# Patient Record
Sex: MALE | Race: OTHER | HISPANIC OR LATINO | URBAN - METROPOLITAN AREA
[De-identification: names, ages, dates, MRNs, and addresses within clinical notes are randomized per-mention and may not be internally consistent; named-entity substitution may affect disease eponyms.]

---

## 2020-06-08 ENCOUNTER — EMERGENCY (EMERGENCY)
Facility: HOSPITAL | Age: 75
LOS: 1 days | Discharge: HOME | End: 2020-06-08
Attending: PERSONAL EMERGENCY RESPONSE ATTENDANT
Payer: SELF-PAY

## 2020-06-08 VITALS
SYSTOLIC BLOOD PRESSURE: 137 MMHG | HEIGHT: 66 IN | OXYGEN SATURATION: 98 % | DIASTOLIC BLOOD PRESSURE: 84 MMHG | HEART RATE: 78 BPM | RESPIRATION RATE: 18 BRPM | TEMPERATURE: 99 F | WEIGHT: 190.04 LBS

## 2020-06-08 VITALS
OXYGEN SATURATION: 99 % | TEMPERATURE: 99 F | HEART RATE: 72 BPM | RESPIRATION RATE: 18 BRPM | DIASTOLIC BLOOD PRESSURE: 96 MMHG | SYSTOLIC BLOOD PRESSURE: 158 MMHG

## 2020-06-08 PROCEDURE — 99283 EMERGENCY DEPT VISIT LOW MDM: CPT

## 2020-06-08 PROCEDURE — 99053 MED SERV 10PM-8AM 24 HR FAC: CPT

## 2020-06-08 PROCEDURE — 99284 EMERGENCY DEPT VISIT MOD MDM: CPT

## 2020-06-08 RX ORDER — OXYCODONE HYDROCHLORIDE 5 MG/1
5 TABLET ORAL ONCE
Refills: 0 | Status: DISCONTINUED | OUTPATIENT
Start: 2020-06-08 | End: 2020-06-08

## 2020-06-08 RX ORDER — LIDOCAINE 4 G/100G
1 CREAM TOPICAL ONCE
Refills: 0 | Status: COMPLETED | OUTPATIENT
Start: 2020-06-08 | End: 2020-06-08

## 2020-06-08 RX ORDER — ACETAMINOPHEN 500 MG
975 TABLET ORAL ONCE
Refills: 0 | Status: COMPLETED | OUTPATIENT
Start: 2020-06-08 | End: 2020-06-08

## 2020-06-08 RX ADMIN — Medication 975 MILLIGRAM(S): at 23:54

## 2020-06-08 RX ADMIN — LIDOCAINE 1 PATCH: 4 CREAM TOPICAL at 23:54

## 2020-06-08 RX ADMIN — OXYCODONE HYDROCHLORIDE 5 MILLIGRAM(S): 5 TABLET ORAL at 23:54

## 2020-06-08 NOTE — ED PROVIDER NOTE - NSFOLLOWUPINSTRUCTIONS_ED_ALL_ED_FT
-Return to the Emergency Department for any worsening or concerning symptoms such as fevers, severe pain, trouble breathing, weakness or lightheadedness, peeing or pooping on yourself.       Call to schedule a follow up appointment in [facility]. The phone number is [phone].  Continue taking your medications as prescribed. Oxycodone was sent to your pharmacy, take as needed for severe pain.   Return to this Emergency Department for worsening condition, difficulties urinating, weakness in legs, or any other emergency.

## 2020-06-08 NOTE — ED PROVIDER NOTE - PHYSICAL EXAMINATION
Gen: No acute distress, alert, cooperative  HENT: Normocephalic, atraumatic.   Eyes: PERRL, EOMI    Resp: CTAB, non-labored, no wheeze  CV: rrr, no murmur  Abd: soft, NTND, no rebound or guarding  MSK: No CVAT bilaterally, straight leg raise + on the right, normal rom all extremities  Skin: warm and dry  Neuro: AOx3, speech is fluent and appropriate, no focal deficits  Psych: Normal affect

## 2020-06-08 NOTE — ED PROVIDER NOTE - OBJECTIVE STATEMENT
75yo M hx htn presenting with 2 weeks of low back pain radiating into back of leg down to the foot. Usually no pain at rest, bad with walking. Works with machinery, so lifts heavy objects at times. No clear inciting incident patient remembers, no trauma/fall. Currently no pain at rest in bed. Taking naproxen for pain, last around 7pm. No fevers, no bowel or bladder incontinence, no numbness or tingling. No midline back pain. 73yo M hx htn presenting with 2 weeks of low back pain radiating into back of leg down to the foot. Usually no pain at rest, bad with walking. Works with machinery, so lifts heavy objects at times. No clear inciting incident patient remembers, no trauma/fall. Currently no pain at rest in bed. Taking naproxen for pain, last around 7pm. No fevers, no bowel or bladder incontinence, no numbness or tingling. No midline back pain.  Of note, likely a bedbug climbing on patient.

## 2020-06-08 NOTE — ED PROVIDER NOTE - ATTENDING CONTRIBUTION TO CARE
Attending MD Reed.  Agree with above. Pt is a 73 yo male with pmhx of HTN.  Pt presents with 2 wks of back pain radiating down R leg to foot.  Pain worse wiht ambulation, improves wiht rest.  No trauma.  No cauda equina red flags. + straight leg raise.  Pt ambulatory in ED.  pt has been taking naproxen.  Dosed oxy/tylenol/lidocaine patch.  Pt's pain improved. No clear inciting event but does do heavy lifting at work.  No hx of back problems previously.  No spinal injections previously.      Prolonged discussion with pt re: need to return to ED for any fevers/chills, loss of bowel/bladder control, development of numbness of perineum and genitals, development of LE weakness.  None of these features are currently present.  Pt referred to outpt spinal service for ongoing management.  He verbalizes understanding of above return precautions as well as recommendation that he follow-up for ongoing management.  Pt stable for discharge home. Attending MD Reed.  Agree with above. Pt is a 75 yo male with pmhx of HTN.  Pt presents with 2 wks of back pain radiating down R leg to foot.  Pain worse wiht ambulation, improves wiht rest.  No trauma.  No cauda equina red flags. + straight leg raise.  Pt ambulatory in ED.  pt has been taking naproxen.  Dosed oxy/tylenol/lidocaine patch.  Pt's pain improved. No clear inciting event but does do heavy lifting at work.  No hx of back problems previously.  Denies IVDU, had a spinal injection Saturday for this pain but it did not worsen or improve this pain.  No fevers/chills.     # [id number].  Prolonged discussion with pt re: need to return to ED for any fevers/chills, loss of bowel/bladder control, development of numbness of perineum and genitals, development of LE weakness.  None of these features are currently present.  Pt referred to outpt spinal service for ongoing management.  He verbalizes understanding of above return precautions as well as recommendation that he follow-up for ongoing management.  Pt stable for discharge home.

## 2020-06-08 NOTE — ED PROVIDER NOTE - PROGRESS NOTE DETAILS
AK: Discussed return precautions and f/u with patient as well as daughter by phone. Specifically discussed cauda equina return precautions with daughter. Also discussed bedbug finding and cleaning precautions.

## 2020-06-08 NOTE — ED PROVIDER NOTE - PATIENT PORTAL LINK FT
You can access the FollowMyHealth Patient Portal offered by [facility] by registering at the following website: [email or web address]. By joining [employer]’s FollowMyHealth portal, you will also be able to view your health information using other applications (apps) compatible with our system.

## 2020-06-09 RX ORDER — OXYCODONE HYDROCHLORIDE 5 MG/1
1 TABLET ORAL
Qty: 8 | Refills: 0
Start: 2020-06-09 | End: 2020-06-10

## 2020-06-09 NOTE — ED ADULT NURSE NOTE - OBJECTIVE STATEMENT
74yM presents to the ED with complaints of R lower back pain radiating down his butt and R leg to his R foot. Pt states this pain began x1wk ago 74yM presents to the ED with complaints of R lower back pain radiating down his butt and R leg to his R foot. Pt states this pain began x2wk ago. Pt denies pain at rest but complains of pain when walking, climbing stairs and when lying completely flat. Pt states pain goes away when sitting. 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 74yM presents to the ED with complaints of R lower back pain radiating down his butt and R leg to his R foot. Pt states this pain began x2wk ago. Pt denies pain at rest but complains of pain when walking, climbing stairs and when lying completely flat. Pt states pain goes away when sitting. Pain also worse when pt lifts leg. 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 Pt denies trauma or fall prior to onset of pain. Pt states "sometimes the pain gets so bad that I can't stand for long periods of time". Pt had been taking Naproxen and had gotten a "spinal shot" which has not improved his pain. Pt is ambulatory in the ED with steady gait. Pt denies CP, abdominal pain, SOB, N/V, headcahes, dizziness, syncope. Pt has +sensation BL. Pt AAOx4, VS as documented. Pt repositioned to comfort.  used. ED MD at bedside to assess.

## 2020-06-09 NOTE — ED ADULT NURSE NOTE - NSIMPLEMENTINTERV_GEN_ALL_ED
Implemented All Universal Safety Interventions:  [place] to call system. Call bell, personal items and telephone within reach. Instruct patient to call for assistance. Room bathroom lighting operational. Non-slip footwear when patient is off stretcher. Physically safe environment: no spills, clutter or unnecessary equipment. Stretcher in lowest position, wheels locked, appropriate side rails in place.

## 2023-09-17 NOTE — ED ADULT NURSE NOTE - CHIEF COMPLAINT
Lab sending second phlebotomist to attempt collection of blood cultures and lactic  
The patient is a 74y Male complaining of back pain general.